# Patient Record
Sex: FEMALE
[De-identification: names, ages, dates, MRNs, and addresses within clinical notes are randomized per-mention and may not be internally consistent; named-entity substitution may affect disease eponyms.]

---

## 2020-05-05 ENCOUNTER — APPOINTMENT (OUTPATIENT)
Dept: OBGYN | Facility: CLINIC | Age: 30
End: 2020-05-05

## 2020-09-25 ENCOUNTER — APPOINTMENT (OUTPATIENT)
Dept: NEUROLOGY | Facility: CLINIC | Age: 30
End: 2020-09-25
Payer: COMMERCIAL

## 2020-09-25 DIAGNOSIS — R51 HEADACHE: ICD-10-CM

## 2020-09-25 PROBLEM — Z00.00 ENCOUNTER FOR PREVENTIVE HEALTH EXAMINATION: Status: ACTIVE | Noted: 2020-09-25

## 2020-09-25 PROCEDURE — 99202 OFFICE O/P NEW SF 15 MIN: CPT | Mod: 95

## 2020-09-25 NOTE — HISTORY OF PRESENT ILLNESS
[Home] : at home, [unfilled] , at the time of the visit. [Other Location: e.g. Home (Enter Location, City,State)___] : at [unfilled] [Verbal consent obtained from patient] : the patient, [unfilled] [Time Spent: ___ minutes] : I have spent [unfilled] minutes with the patient on the telephone [FreeTextEntry1] : video visit was done via av technology in real time\par patient is a 29 yo female who presents for the evaluation of headaches. she reports that she had been going to neuro in aug 2019 bc she had chronic ha since age 16. she had mri at the time which was normal.  she was emgality for one month in july 2019 and it didn’t have time to help bc she became pregnant. during her pregnancy ha were not as bad but she \par day before her period she gets a severe ha x 2 days.\par they are unilateral at vertex, no n/v, no photophobia, severe ha\par no aura\par no focal complaints\par propranolol helped her in the past but she cannot remember dose.\par gets 3 ha a week in addition to the premenstrual ha\par \par other pmh: none\par meds : none\par family: dad with migraine as well as sisters and daughter \par \par \par \par \par PE\par MS INTACT\par CR NN NORMAL\par MOTOR AND GAIT NL

## 2020-10-11 ENCOUNTER — TRANSCRIPTION ENCOUNTER (OUTPATIENT)
Age: 30
End: 2020-10-11

## 2020-10-19 ENCOUNTER — APPOINTMENT (OUTPATIENT)
Dept: NEUROLOGY | Facility: CLINIC | Age: 30
End: 2020-10-19
Payer: COMMERCIAL

## 2020-10-19 VITALS
SYSTOLIC BLOOD PRESSURE: 112 MMHG | DIASTOLIC BLOOD PRESSURE: 77 MMHG | HEART RATE: 69 BPM | TEMPERATURE: 98.3 F | BODY MASS INDEX: 27.6 KG/M2 | OXYGEN SATURATION: 99 % | HEIGHT: 62 IN | WEIGHT: 150 LBS

## 2020-10-19 PROCEDURE — 99214 OFFICE O/P EST MOD 30 MIN: CPT

## 2020-10-19 PROCEDURE — 99072 ADDL SUPL MATRL&STAF TM PHE: CPT

## 2021-04-16 ENCOUNTER — APPOINTMENT (OUTPATIENT)
Dept: NEUROLOGY | Facility: CLINIC | Age: 31
End: 2021-04-16
Payer: COMMERCIAL

## 2021-04-16 VITALS
BODY MASS INDEX: 25.03 KG/M2 | SYSTOLIC BLOOD PRESSURE: 116 MMHG | TEMPERATURE: 98 F | OXYGEN SATURATION: 99 % | DIASTOLIC BLOOD PRESSURE: 70 MMHG | HEIGHT: 62 IN | HEART RATE: 68 BPM | WEIGHT: 136 LBS

## 2021-04-16 PROCEDURE — 99213 OFFICE O/P EST LOW 20 MIN: CPT

## 2021-04-16 PROCEDURE — 99072 ADDL SUPL MATRL&STAF TM PHE: CPT

## 2021-04-16 RX ORDER — RIZATRIPTAN BENZOATE 10 MG/1
10 TABLET ORAL TWICE DAILY
Qty: 1 | Refills: 3 | Status: DISCONTINUED | COMMUNITY
Start: 2020-09-25 | End: 2021-04-16

## 2021-04-16 RX ORDER — PROPRANOLOL HYDROCHLORIDE 10 MG/1
10 TABLET ORAL 3 TIMES DAILY
Qty: 90 | Refills: 1 | Status: DISCONTINUED | COMMUNITY
Start: 2020-09-25 | End: 2021-04-16

## 2021-04-16 RX ORDER — PROPRANOLOL HYDROCHLORIDE 60 MG/1
60 CAPSULE, EXTENDED RELEASE ORAL
Qty: 90 | Refills: 3 | Status: DISCONTINUED | COMMUNITY
Start: 2020-10-19 | End: 2021-04-16

## 2021-04-16 NOTE — ASSESSMENT
[FreeTextEntry1] : Migraine without aura.  Some improvement on propranolol is a good sign.\par Will increase to 80 mg LA daily and try a different triptan.\par If zolmitriptan fails she may combine it with motrin.\par Failing that will seek approval for Ubrelvy.\par \par Return in 6 months.

## 2021-04-16 NOTE — HISTORY OF PRESENT ILLNESS
[FreeTextEntry1] : Pt is 30 yof with headaches since age 16.  Was happening almost every day prior to propranolol now on 60 mg LA once a day and frequency is 2-3 times per week.  Severity is similar.  Mild phonophobia.  Motrin helped but not rizatriptan.  Severity 5/10 with two motrin.  She has gotten nauseated only twice in her life with the headaches.  Usually wakes up at 7 am, if 7:40 then has headache.  Only drinks 4 ounces coffee in am.  Denies side effects from propranolol.  Has not tried other preventive meds.  Sleep is not very good, son is 10 months and wakes her up. Had tried tramadol in past didn't help.  Has not been on topirimate.\par \par Strong family history of migraine.  Father and sisters have it.

## 2021-09-07 ENCOUNTER — RX RENEWAL (OUTPATIENT)
Age: 31
End: 2021-09-07

## 2021-10-19 ENCOUNTER — APPOINTMENT (OUTPATIENT)
Dept: NEUROLOGY | Facility: CLINIC | Age: 31
End: 2021-10-19
Payer: COMMERCIAL

## 2021-10-19 VITALS
BODY MASS INDEX: 26.31 KG/M2 | SYSTOLIC BLOOD PRESSURE: 115 MMHG | TEMPERATURE: 98.2 F | HEART RATE: 60 BPM | OXYGEN SATURATION: 99 % | HEIGHT: 62 IN | WEIGHT: 143 LBS | DIASTOLIC BLOOD PRESSURE: 74 MMHG

## 2021-10-19 PROCEDURE — 99213 OFFICE O/P EST LOW 20 MIN: CPT

## 2021-10-19 RX ORDER — ZOLMITRIPTAN 5 MG/1
5 TABLET, FILM COATED ORAL
Qty: 9 | Refills: 11 | Status: DISCONTINUED | COMMUNITY
Start: 2021-04-16 | End: 2021-10-19

## 2021-10-19 NOTE — HISTORY OF PRESENT ILLNESS
[FreeTextEntry1] : Pt seen in f/u for migraines, now occuring 1-3 times per week (used to be daily but improved on propranolol).\par Propranolol dose increased from 60-> 80 mg, not much change.  Has to pay attention when working out because of propranolol, afraid of fainting. If she is winded will stop and pay attention to heart rate.   Mostly goes to 130, highest 170.  \par \par Sumatriptan didn't work at all.  The zolmitriptan she feels anxious and first time had to lay down felt a little ill.  \par \par Tried magnesium oxide 400 mg/day but felt like that was giving her more headache.

## 2021-10-19 NOTE — ASSESSMENT
[FreeTextEntry1] : Migraine headaches, some improvement on propranolol but still a few per week.\par No positive response to two different triptans.\par \par Plan:\par 1.  Continue propranolol 80 mg/day (refill provided).\par 2.  Nurtec 75 mg po prn for migraine.\par 3.  Return in 6 months.

## 2022-05-20 ENCOUNTER — APPOINTMENT (OUTPATIENT)
Dept: NEUROLOGY | Facility: CLINIC | Age: 32
End: 2022-05-20
Payer: COMMERCIAL

## 2022-05-20 ENCOUNTER — NON-APPOINTMENT (OUTPATIENT)
Age: 32
End: 2022-05-20

## 2022-05-20 VITALS
OXYGEN SATURATION: 98 % | BODY MASS INDEX: 26.31 KG/M2 | DIASTOLIC BLOOD PRESSURE: 78 MMHG | SYSTOLIC BLOOD PRESSURE: 125 MMHG | WEIGHT: 143 LBS | HEART RATE: 76 BPM | TEMPERATURE: 97 F | HEIGHT: 62 IN

## 2022-05-20 PROCEDURE — 99214 OFFICE O/P EST MOD 30 MIN: CPT

## 2022-05-20 NOTE — PHYSICAL EXAM
[FreeTextEntry1] : EOM's full.  Speech is fluent.\par Motor: 5/5 upper and lower extremities.\par No pronator drift.\par Normal FTN and HTS.\par Normal gait.

## 2022-05-20 NOTE — HISTORY OF PRESENT ILLNESS
[FreeTextEntry1] : Pt presents for f/u of headaches.  States past week headaches increased to 3-4 times a week.  States not always light and sensitivity.

## 2022-05-20 NOTE — ASSESSMENT
[FreeTextEntry1] : Headaches, some increase recently likely due to travel and caring for 2 year old with interupted sleep.\par She plans to try to get pregnant in August.  Shoult not use either propranolol or nurtec during pregnancy.\par \par Plan:\par 1.  Taper off propranolol.\par 2.  May use nurtec 75 mg every other day for next 2 months to see if that reduces headache but then stop it 2 weeks prior to trying to become pregnant.\par 3.  May use tyelnol and antiemetic prn for headaches during pregnancy.    She will clear all through OBGYN.\par 4.  Return in 6 months.

## 2022-10-05 ENCOUNTER — RX RENEWAL (OUTPATIENT)
Age: 32
End: 2022-10-05

## 2023-01-23 ENCOUNTER — APPOINTMENT (OUTPATIENT)
Dept: NEUROLOGY | Facility: CLINIC | Age: 33
End: 2023-01-23

## 2023-08-25 ENCOUNTER — APPOINTMENT (OUTPATIENT)
Dept: NEUROLOGY | Facility: CLINIC | Age: 33
End: 2023-08-25
Payer: COMMERCIAL

## 2023-08-25 VITALS
WEIGHT: 166 LBS | HEIGHT: 62 IN | HEART RATE: 64 BPM | DIASTOLIC BLOOD PRESSURE: 78 MMHG | BODY MASS INDEX: 30.55 KG/M2 | SYSTOLIC BLOOD PRESSURE: 113 MMHG

## 2023-08-25 DIAGNOSIS — Z82.0 FAMILY HISTORY OF EPILEPSY AND OTHER DISEASES OF THE NERVOUS SYSTEM: ICD-10-CM

## 2023-08-25 PROCEDURE — 99214 OFFICE O/P EST MOD 30 MIN: CPT

## 2023-08-25 RX ORDER — PROPRANOLOL HYDROCHLORIDE 10 MG/1
10 TABLET ORAL
Qty: 180 | Refills: 1 | Status: DISCONTINUED | COMMUNITY
Start: 2022-05-20 | End: 2023-08-25

## 2023-08-25 NOTE — HISTORY OF PRESENT ILLNESS
[FreeTextEntry1] : 31 y/o F presents for f/u of headaches. Pt used to take propanolol and Nurtec, but they were both discontinued as she was trying to get pregnant. Pt delivered 7 weeks ago and started taking Nurtec again (she had left over boxes). She experienced relief from the Nurtec during the first few weeks but progressively lost its effect and now she has to supplement it with Tylenol and Exedrine.  States that she has headaches 4 times a week. No photosensitivity, but sensitive to sound. Variable presentation, fontal, occipital, behind the eyes. No clear triggers. Pt has tried triptans and magnesium pills in the past but could not tolerate either of them. She is interested in trying botox or any other non-pharmacologic intervention as she feels that nothing has worked for her.

## 2023-08-25 NOTE — ASSESSMENT
[FreeTextEntry1] : Headaches, some increase recently likely due to travel and caring for 2 year old with interupted sleep. She plans to try to get pregnant in August.  Shoult not use either propranolol or nurtec during pregnancy.  Plan: 1.  Restarted Propanolol 80mg qd for migraine prophylaxis  2.   Restarted Nurtec 75 mg every other day for migraine prophylaxis  3.  Will start insurance approval for Botox given the worsening of the headache frequency.  4.  Return in 3 months.

## 2023-08-28 RX ORDER — PROPRANOLOL HYDROCHLORIDE 80 MG/1
80 CAPSULE, EXTENDED RELEASE ORAL
Qty: 90 | Refills: 3 | Status: ACTIVE | COMMUNITY
Start: 2021-04-16 | End: 1900-01-01

## 2023-10-12 ENCOUNTER — APPOINTMENT (OUTPATIENT)
Dept: NEUROLOGY | Facility: CLINIC | Age: 33
End: 2023-10-12
Payer: COMMERCIAL

## 2023-10-12 DIAGNOSIS — G43.009 MIGRAINE W/OUT AURA, NOT INTRACTABLE, W/OUT STATUS MIGRAINOSUS: ICD-10-CM

## 2023-10-12 PROCEDURE — 99214 OFFICE O/P EST MOD 30 MIN: CPT | Mod: 25

## 2023-10-12 PROCEDURE — 64615 CHEMODENERV MUSC MIGRAINE: CPT

## 2023-10-12 RX ADMIN — ONABOTULINUMTOXINA 1 UNIT: 100 INJECTION, POWDER, LYOPHILIZED, FOR SOLUTION INTRADERMAL; INTRAMUSCULAR at 00:00

## 2023-10-12 RX ADMIN — ONABOTULINUMTOXINA 55 UNIT: 100 INJECTION, POWDER, LYOPHILIZED, FOR SOLUTION INTRADERMAL; INTRAMUSCULAR at 00:00

## 2024-03-22 RX ORDER — RIMEGEPANT SULFATE 75 MG/75MG
75 TABLET, ORALLY DISINTEGRATING ORAL
Qty: 18 | Refills: 3 | Status: ACTIVE | COMMUNITY
Start: 2021-10-19 | End: 1900-01-01